# Patient Record
Sex: FEMALE | Race: WHITE | Employment: UNEMPLOYED | URBAN - METROPOLITAN AREA
[De-identification: names, ages, dates, MRNs, and addresses within clinical notes are randomized per-mention and may not be internally consistent; named-entity substitution may affect disease eponyms.]

---

## 2017-12-14 ENCOUNTER — TRANSFERRED RECORDS (OUTPATIENT)
Dept: HEALTH INFORMATION MANAGEMENT | Facility: CLINIC | Age: 42
End: 2017-12-14

## 2018-01-16 ENCOUNTER — MEDICAL CORRESPONDENCE (OUTPATIENT)
Dept: HEALTH INFORMATION MANAGEMENT | Facility: CLINIC | Age: 43
End: 2018-01-16

## 2018-01-16 ENCOUNTER — TRANSFERRED RECORDS (OUTPATIENT)
Dept: HEALTH INFORMATION MANAGEMENT | Facility: CLINIC | Age: 43
End: 2018-01-16

## 2018-01-17 ENCOUNTER — TELEPHONE (OUTPATIENT)
Dept: ORTHOPEDICS | Facility: CLINIC | Age: 43
End: 2018-01-17

## 2018-01-17 NOTE — TELEPHONE ENCOUNTER
"Pt calls today to ask if \"we have received faxes about her\". Pt last seen by Dr Gannon in 2014 and would like to come back and see him again for her hip. Pt was warm transferred to Adult referral line for assistance in getting the referral information, records, etc.   "

## 2018-01-18 NOTE — TELEPHONE ENCOUNTER
APPT INFO    Date /Time: 2/19/18 1:30PM   Reason for Appt: Chronic Rt Hip Pain   Ref Provider/Clinic: Dr. Nima Turner   Are there internal records? Yes/No?  IF YES, list clinic names: Yes - [facility], last seen with Dr. Gannon in 2013    - Imaging is in Pacs  - Right Hip Arthrotomy 6/14/13 op-note in Epic     Are there outside records? Yes/No? Yes - see below   Patient Contact (Y/N) & Call Details: No - pt is referred   Action: Chart reviewed.    Records received from referring clinic, sent to Ortho Clinic.     OUTSIDE RECORDS CHECKLIST     CLINIC NAME COMMENTS REC (x) IMG (x)   [facility] OFFICE NOTES: 1/16/18     RADIOLOGY: XR Rt Hip/Pelvis 1/16/17     x x

## 2018-01-18 NOTE — TELEPHONE ENCOUNTER
ACTION    What did you do? Faxed request to [facility] for additional records and imaging disc

## 2018-01-18 NOTE — TELEPHONE ENCOUNTER
Records Received From: [facility]    Date/Exam/Location  (specify location if different)   Office Notes: 1/16/18 (duplicate), 11/16/17, 1/26/17, 1/19/16   Radiology Reports: XR R Hip/pelvis 1/16/17 (duplicate), 1/26/17   Procedure Notes:  (include injections) Densitometry Report 12/14/17 -- [facility]    Steroid Hip Injection 11/16/17, 1/26/17, 1/19/16   Missing: Need imaging -- per fax, CD is being mailed

## 2018-01-22 NOTE — TELEPHONE ENCOUNTER
Received Imaging From: [facility]    Image Type (x): Disc:_x__  Pacs:___      Exam Date/Name: 1/16/18, 1/26/17, 4/7/15 Pelvis / Hip XRs   Comments: Sent to [facility] by chen

## 2018-02-19 ENCOUNTER — OFFICE VISIT (OUTPATIENT)
Dept: ORTHOPEDICS | Facility: CLINIC | Age: 43
End: 2018-02-19
Payer: COMMERCIAL

## 2018-02-19 ENCOUNTER — PRE VISIT (OUTPATIENT)
Dept: ORTHOPEDICS | Facility: CLINIC | Age: 43
End: 2018-02-19

## 2018-02-19 VITALS — BODY MASS INDEX: 22.15 KG/M2 | HEIGHT: 63 IN | WEIGHT: 125 LBS

## 2018-02-19 DIAGNOSIS — M76.891 ENTHESOPATHY OF RIGHT HIP REGION: ICD-10-CM

## 2018-02-19 DIAGNOSIS — F17.200 SMOKER: ICD-10-CM

## 2018-02-19 RX ORDER — AMMONIUM LACTATE 12 G/100G
CREAM TOPICAL 2 TIMES DAILY
COMMUNITY

## 2018-02-19 RX ORDER — ESTRADIOL 0.1 MG/D
1 FILM, EXTENDED RELEASE TRANSDERMAL
COMMUNITY

## 2018-02-19 RX ORDER — CALCIUM CARBONATE 500 MG/1
1 TABLET, CHEWABLE ORAL DAILY
COMMUNITY

## 2018-02-19 ASSESSMENT — ACTIVITIES OF DAILY LIVING (ADL)
ADL_MEAN: 0
ADL_SUBSCALE_SCORE: 100
ADL_SUM: 0

## 2018-02-19 NOTE — MR AVS SNAPSHOT
"              After Visit Summary   2018    Marylou Gray    MRN: [id number]           Patient Information     Date Of Birth          1975        Visit Information        Provider Department      2018 1:30 PM Casey Gannon MD [facility]        Today's Diagnoses     Smoker        Enthesopathy of right hip region           Follow-ups after your visit        Who to contact     Please call your clinic at [phone] to:    Ask questions about your health    Make or cancel appointments    Discuss your medicines    Learn about your test results    Speak to your doctor            Additional Information About Your Visit        MyChart Information     [employer] is an electronic gateway that provides easy, online access to your medical records. With [employer], you can request a clinic appointment, read your test results, renew a prescription or communicate with your care team.     To sign up for [employer] visit the website at [email or web address]   You will be asked to enter the access code listed below, as well as some personal information. Please follow the directions to create your username and password.     Your access code is: [email or web address]  Expires: 2018  6:30 AM     Your access code will  in 90 days. If you need help or a new code, please contact your [facility] or call [phone] for assistance.        Care EveryWhere ID     This is your Care EveryWhere ID. This could be used by other organizations to access your [place] medical records  [id number]        Your Vitals Were     Height BMI (Body Mass Index)                1.6 m (5' 3\") 22.14 kg/m2           Blood Pressure from Last 3 Encounters:   13 114/70    Weight from Last 3 Encounters:   18 56.7 kg (125 lb)   13 63.5 kg (140 lb)   13 57.7 kg (127 lb 3.3 oz)              Today, you had the following     No orders found for display         Today's Medication " Changes          These changes are accurate as of 2/19/18  2:55 PM.  If you have any questions, ask your nurse or doctor.               Stop taking these medicines if you haven't already. Please contact your care team if you have questions.     benztropine 1 MG tablet   Commonly known as:  COGENTIN   Stopped by:  Casey Gannon MD           EFFEXOR PO   Stopped by:  Casey Gannon MD           HYDROcodone-acetaminophen 5-325 MG per tablet   Commonly known as:  NORCO   Stopped by:  Casey Gannon MD           INVEGA PO   Stopped by:  Casey Gannon MD           propranolol 20 MG tablet   Commonly known as:  INDERAL   Stopped by:  Casey Gannon MD           topiramate 50 MG tablet   Commonly known as:  TOPAMAX   Stopped by:  Casey Gannon MD                    Primary Care Provider Office Phone # Fax #    Ian Correa [phone]       [facility] [address] [facility] [address]        Equal Access to Services     SHANE MADRID AH: Hadii aad ku hadasho Soomaali, waaxda luqadaha, qaybta kaalmada adeegyada, waxay idiin hayaan seth kharash hector . So [facility] [phone].    ATENCIÓN: Si habla español, tiene a stanley disposición servicios gratuitos de asistencia lingüística. Llame al [phone].    We comply with applicable federal civil rights laws and [place] laws. We do not discriminate on the basis of race, color, national origin, age, disability, sex, sexual orientation, or gender identity.            Thank you!     Thank you for choosing [facility]  for your care. Our goal is always to provide you with excellent care. Hearing back from our patients is one way we can continue to improve our services. Please take a few minutes to complete the written survey that you may receive in the mail after your visit with us. Thank you!             Your Updated Medication List - Protect others around you: Learn how to safely use, store and throw away your medicines  at www.disposemymeds.org.          This list is accurate as of 2/19/18  2:55 PM.  Always use your most recent med list.                   Brand Name Dispense Instructions for use Diagnosis    ammonium lactate 12 % cream    AMLACTIN     Apply topically 2 times daily        BELSOMRA 20 MG tablet   Generic drug:  Suvorexant      Take 10 mg by mouth nightly as needed        calcium carbonate 500 MG chewable tablet    TUMS     Take 1 chew tab by mouth daily        DULOXETINE HCL PO      Take 20 mg by mouth        estradiol 0.1 MG/24HR BIW patch    VIVELLE-DOT     Place 1 patch onto the skin twice a week        gabapentin 300 MG capsule    NEURONTIN     Take 400 mg by mouth 3 times daily        IBUPROFEN PO      Take 800 mg by mouth        IMITREX PO      Take 100 mg by mouth as needed.        MEDROXYPROGESTERONE ACETATE PO      Take 2.5 mg by mouth        potassium 75 MG Tabs      Take 20 mg by mouth daily        SEROQUEL PO      Take 500 mg by mouth At Bedtime        VITAMIN D (CHOLECALCIFEROL) PO      Take 32,000 Units by mouth daily

## 2018-02-19 NOTE — LETTER
2/19/2018       RE: Marylou Gray  [address]  [address]  [facility] [address]     Dear Colleague,    Thank you for referring your patient, Marylou Gray, to the [facility] at [facility]. Please see a copy of my visit note below.        [facility]  Orthopaedic Surgery, Joint Replacement Consultation  by Casey Gannon M.D.    Marylou Gray MRN# [id number]   Age: 42 year old YOB: 1975     Requesting physician: Ian Roy            Assessment and Plan:   Assessment:  Right hip chronic bursal irritation.  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    Plan:  Patient requests right hip replacement.  However, I see no indication to perform hip replacement.  Furthermore this is not in her best interest based on ongoing disease severity but her age as well.  I advised that she consider using a cane periodically when her symptoms flare.  Otherwise abductor muscle strengthening exercises would be helpful.  I also strongly advised her to cease all forms of nicotine usage as this will help the soft tissue healing process.  She also inquired about the usage of systemic steroids for treatment and I advised against this treatment.          History of Present Illness:   42 year old female  chief complaint    Right hip pain and discomfort and frequent falling.  Patient notes that she tends to fall frequently and she believes this is related to the symptoms about her right hip joint.  She has had several surgical procedures performed on the right hip initiated by Dr. Konstantin Peña many years ago as noted below.  The exact procedure is unknown however at that time, suture anchors were placed in the greater trochanter.      Background history:  TREATMENTS:  1. 1996, R hip surgery with lateral suture anchor placement (Rodriguez Peña)  2. 11/9/2005, Debridement R hip bursa and hip abductor reconstruction (Rodriguez Peña)  "[facility]  3. 2013, suture removal and bursal debridement R hip (Jagdeep) [facility]             Physical Exam:     EXAMINATION pertinent findings:   VITAL SIGNS: Height 1.6 m (5' 3\"), weight 56.7 kg (125 lb).  Body mass index is 22.14 kg/(m^2).  RESP: non labored breathing   ABD: benign   SKIN: grossly normal   LYMPHATIC: grossly normal   NEURO: grossly normal   VASCULAR: satisfactory perfusion of all extremities   MUSCULOSKELETAL:   She has an antalgic gait.  Abductor muscle strength is decreased on the right side.  There is tenderness to palpation in the region of her incision.  No erythema or warmth or discoloration is appreciated.  Full range of motion of the hip is noted.             Data:   All laboratory data reviewed  All imaging studies reviewed by me    Radiographs from January 16, 2018 performed by Dr. Foss's office demonstrate minimal evidence of degenerative disease involving the right hip joint.  Suture anchors are in place and unchanged since prior films.  No evidence of any osteolysis about the suture anchors.     Casey Gannon MD  [facility] Family Professor  Oncology and Adult Reconstructive Surgery  Dept Orthopaedic Surgery, [facility]  [phone] office, [phone] pager  [email or web address]            DATA for DOCUMENTATION:         Past Medical History:     Patient Active Problem List   Diagnosis     Smoker     Enthesopathy of hip region     Past Medical History:   Diagnosis Date     Learning disability      Ulcer, gastric, acute        Also see scanned health assessment forms.       Past Surgical History:     Past Surgical History:   Procedure Laterality Date     APPENDECTOMY       ARTHROTOMY HIP  6/14/2013    Procedure: ARTHROTOMY HIP;  Bursectomy Right Hip ;  Surgeon: Casey Gannon MD;  Location:  OR      PELVIS/HIP JOINT SURGERY UNLISTED       GYN SURGERY              Social History:     Social History     Social History     Marital status: Single     Spouse name: N/A     Number of " children: N/A     Years of education: N/A     Occupational History     Not on file.     Social History Main Topics     Smoking status: Current Every Day Smoker     Packs/day: 0.50     Years: 29.00     Types: Cigarettes     Smokeless tobacco: Current User     Alcohol use No     Drug use: No     Sexual activity: Yes     Partners: Male     Birth control/ protection: None     Other Topics Concern     Not on file     Social History Narrative            Family History:     No family history on file.         Medications:     Current Outpatient Prescriptions   Medication Sig     VITAMIN D, CHOLECALCIFEROL, PO Take 32,000 Units by mouth daily     potassium 75 MG TABS Take 20 mg by mouth daily     estradiol (VIVELLE-DOT) 0.1 MG/24HR BIW patch Place 1 patch onto the skin twice a week     Suvorexant (BELSOMRA) 20 MG tablet Take 10 mg by mouth nightly as needed     IBUPROFEN PO Take 800 mg by mouth     DULOXETINE HCL PO Take 20 mg by mouth     MEDROXYPROGESTERONE ACETATE PO Take 2.5 mg by mouth     calcium carbonate (TUMS) 500 MG chewable tablet Take 1 chew tab by mouth daily     ammonium lactate (AMLACTIN) 12 % cream Apply topically 2 times daily     SUMAtriptan Succinate (IMITREX PO) Take 100 mg by mouth as needed.     QUEtiapine Fumarate (SEROQUEL PO) Take 500 mg by mouth At Bedtime      gabapentin (NEURONTIN) 300 MG capsule Take 400 mg by mouth 3 times daily      No current facility-administered medications for this visit.               Review of Systems:   A comprehensive 10 point review of systems (constitutional, ENT, cardiac, peripheral vascular, lymphatic, respiratory, GI, , Musculoskeletal, skin, Neurological) was performed and found to be negative except as described in this note.     See intake form completed by patient      Again, thank you for allowing me to participate in the care of your patient.      Sincerely,    Casey Gannon MD

## 2018-02-19 NOTE — NURSING NOTE
"Chief Complaint   Patient presents with     Consult     Pt states that she is here today to Discuss having Right TRISTIAN. Bursectomy Right Hip DOS: 6/14/13 done by Dr. Gannon        42 year old  1975    Ht 1.6 m (5' 3\")  Wt 56.7 kg (125 lb)  BMI 22.14 kg/m2         [facility] [place] [address]    Allergies   Allergen Reactions     Grass Other (See Comments)     \"Sniffles\"     Mold Other (See Comments)     Stuffy nose     Tylenol With Codeine [Acetaminophen-Codeine] Nausea and Vomiting     Current Outpatient Prescriptions   Medication     VITAMIN D, CHOLECALCIFEROL, PO     potassium 75 MG TABS     estradiol (VIVELLE-DOT) 0.1 MG/24HR BIW patch     Suvorexant (BELSOMRA) 20 MG tablet     IBUPROFEN PO     DULOXETINE HCL PO     MEDROXYPROGESTERONE ACETATE PO     calcium carbonate (TUMS) 500 MG chewable tablet     ammonium lactate (AMLACTIN) 12 % cream     SUMAtriptan Succinate (IMITREX PO)     QUEtiapine Fumarate (SEROQUEL PO)     gabapentin (NEURONTIN) 300 MG capsule     No current facility-administered medications for this visit.          Questionnaires:  HOOS Hip Dysfunction & Osteoarthritis Outcome Questionnaire    Hip Dysfunction & Osteoarthritis Outcome Score (HOOS), English Version LK 2.0 (Janak BENAVIDEZ, Chris E, James VINCENT., 2003) 2/19/2018   S1. Do you feel grinding, hear clicking or any other type of noise from your hip? Never   S2. Difficulties spreading legs wide apart None   S3. Difficulties to stride out when walking None   S4. How severe is your hip joint stiffness after first wakening in the morning? None   S5. How severe is your hip stiffness after sitting, lying or resting LATER IN THE DAY? None   Symptom Count 5   Symptom Sum 0   Symptom Mean 0   Symptom Subscale Score 100   P1. How often is your hip painful? Never   P2. Straightening your hip fully None   P3. Bending your hip FULLY None   P4. Walking on a flat surface None   P5. Going up or down stairs None   P6. " At night while in bed None   P7. Sitting or lying None   P8. Standing upright None   P9. Walking on a hard surface (asphalt, concrete, etc.) None   P10. Walking on an uneven surface None   Pain Count 10   Pain Sum 0   Pain Mean 0   Pain Subscale Score 100   A1. Descending stairs None   A2. Ascending stairs None   A3. Rising from sitting None   A4. Standing None   A5. Bending to the floor/ an object None   A6. Walking on a flat surface None   A7. Getting in/out of car None   A8. Going shopping None   A9. Putting on socks/stockings None   A10. Rising from bed None   A11. Taking off socks/stockings None   A12. Lying in bed (turning over, maintaining hip position) None   A13. Getting in/out of bed None   A14. Sitting None   A15. Getting on/off toilet None   A16. Heavy domestic duties (moving heavy boxes, scrubbing floors, etc.) None   A17. Light domestic duties (cooking, dusting, etc.) None   ADL Count 17   ADL Sum 0   ADL Mean 0   ADL Subscale Score 100   SP1. Squatting None   SP2. Running None   SP3. Twisting/pivoting on loaded leg None   SP4. Walking on uneven surface None   Sports/Rec Count 4   Sports/Rec Sum 0   Sports/Rec Mean 0   Sports/Rec Subscale Score 100   Q1. How often are you aware of your hip problem? Never   Q2. Have you modified you life style to avoid activities potentially damaging to your hip? Not at all   Q3. How much are you troubled with lack of confidence in your hip? Not at all   Q4. In general, how much difficulty do you have with your hip? None   QOL Count 4   QOL Sum 0   QOL Mean 0   Quality of Life Subscale Score 100            Promis 10 Assessment    PROMIS 10 2/19/2018   In general, would you say your health is: Good   In general, would you say your quality of life is: Good   In general, how would you rate your physical health? Good   In general, how would you rate your mental health, including your mood and your ability to think? Good   In general, how would you rate your  satisfaction with your social activities and relationships? Good   In general, please rate how well you carry out your usual social activities and roles Good   To what extent are you able to carry out your everyday physical activities such as walking, climbing stairs, carrying groceries, or moving a chair? Not at all   How often have you been bothered by emotional problems such as feeling anxious, depressed or irritable? Often   How would you rate your fatigue on average? None   How would you rate your pain on average?   0 = No Pain  to  10 = Worst Imaginable Pain 7   In general, would you say your health is: 3   In general, would you say your quality of life is: 3   In general, how would you rate your physical health? 3   In general, how would you rate your mental health, including your mood and your ability to think? 3   In general, how would you rate your satisfaction with your social activities and relationships? 3   In general, please rate how well you carry out your usual social activities and roles. (This includes activities at home, at work and in your community, and responsibilities as a parent, child, spouse, employee, friend, etc.) 3   To what extent are you able to carry out your everyday physical activities such as walking, climbing stairs, carrying groceries, or moving a chair? 1   In the past 7 days, how often have you been bothered by emotional problems such as feeling anxious, depressed, or irritable? 4   In the past 7 days, how would you rate your fatigue on average? 1   In the past 7 days, how would you rate your pain on average, where 0 means no pain, and 10 means worst imaginable pain? 7   Global Mental Health Score 11   Global Physical Health Score 11   PROMIS TOTAL - SUBSCORES 22   Some recent data might be hidden                Key Frias C.M.A.

## 2018-02-19 NOTE — PROGRESS NOTES
"    [facility]  Orthopaedic Surgery, Joint Replacement Consultation  by Caesy Gannon M.D.    Marylou Gray MRN# [id number]   Age: 42 year old YOB: 1975     Requesting physician: Ian Roy            Assessment and Plan:   Assessment:  Right hip chronic bursal irritation.  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    Plan:  Patient requests right hip replacement.  However, I see no indication to perform hip replacement.  Furthermore this is not in her best interest based on ongoing disease severity but her age as well.  I advised that she consider using a cane periodically when her symptoms flare.  Otherwise abductor muscle strengthening exercises would be helpful.  I also strongly advised her to cease all forms of nicotine usage as this will help the soft tissue healing process.  She also inquired about the usage of systemic steroids for treatment and I advised against this treatment.          History of Present Illness:   42 year old female  chief complaint    Right hip pain and discomfort and frequent falling.  Patient notes that she tends to fall frequently and she believes this is related to the symptoms about her right hip joint.  She has had several surgical procedures performed on the right hip initiated by Dr. Konstantin Peña many years ago as noted below.  The exact procedure is unknown however at that time, suture anchors were placed in the greater trochanter.      Background history:  TREATMENTS:  1. 1996, R hip surgery with lateral suture anchor placement (Rodriguez Peña)  2. 11/9/2005, Debridement R hip bursa and hip abductor reconstruction (Rodriguez Peña) [facility]  3. 2013, suture removal and bursal debridement R hip (Jagdeep) [facility]             Physical Exam:     EXAMINATION pertinent findings:   VITAL SIGNS: Height 1.6 m (5' 3\"), weight 56.7 kg (125 lb).  Body mass index is 22.14 kg/(m^2).  RESP: non labored breathing   ABD: benign   SKIN: grossly " normal   LYMPHATIC: grossly normal   NEURO: grossly normal   VASCULAR: satisfactory perfusion of all extremities   MUSCULOSKELETAL:   She has an antalgic gait.  Abductor muscle strength is decreased on the right side.  There is tenderness to palpation in the region of her incision.  No erythema or warmth or discoloration is appreciated.  Full range of motion of the hip is noted.             Data:   All laboratory data reviewed  All imaging studies reviewed by me    Radiographs from January 16, 2018 performed by Dr. Foss's office demonstrate minimal evidence of degenerative disease involving the right hip joint.  Suture anchors are in place and unchanged since prior films.  No evidence of any osteolysis about the suture anchors.     Casey Gannon MD [facility]  Oncology and Adult Reconstructive Surgery  Dept Orthopaedic Surgery, [facility]  [phone] office, [phone] pager  [email or web address]            DATA for DOCUMENTATION:         Past Medical History:     Patient Active Problem List   Diagnosis     Smoker     Enthesopathy of hip region     Past Medical History:   Diagnosis Date     Learning disability      Ulcer, gastric, acute        Also see scanned health assessment forms.       Past Surgical History:     Past Surgical History:   Procedure Laterality Date     APPENDECTOMY       ARTHROTOMY HIP  6/14/2013    Procedure: ARTHROTOMY HIP;  Bursectomy Right Hip ;  Surgeon: Casey Gannon MD;  Location:  OR      PELVIS/HIP JOINT SURGERY UNLISTED       GYN SURGERY              Social History:     Social History     Social History     Marital status: Single     Spouse name: N/A     Number of children: N/A     Years of education: N/A     Occupational History     Not on file.     Social History Main Topics     Smoking status: Current Every Day Smoker     Packs/day: 0.50     Years: 29.00     Types: Cigarettes     Smokeless tobacco: Current User     Alcohol use No     Drug use: No      Sexual activity: Yes     Partners: Male     Birth control/ protection: None     Other Topics Concern     Not on file     Social History Narrative            Family History:     No family history on file.         Medications:     Current Outpatient Prescriptions   Medication Sig     VITAMIN D, CHOLECALCIFEROL, PO Take 32,000 Units by mouth daily     potassium 75 MG TABS Take 20 mg by mouth daily     estradiol (VIVELLE-DOT) 0.1 MG/24HR BIW patch Place 1 patch onto the skin twice a week     Suvorexant (BELSOMRA) 20 MG tablet Take 10 mg by mouth nightly as needed     IBUPROFEN PO Take 800 mg by mouth     DULOXETINE HCL PO Take 20 mg by mouth     MEDROXYPROGESTERONE ACETATE PO Take 2.5 mg by mouth     calcium carbonate (TUMS) 500 MG chewable tablet Take 1 chew tab by mouth daily     ammonium lactate (AMLACTIN) 12 % cream Apply topically 2 times daily     SUMAtriptan Succinate (IMITREX PO) Take 100 mg by mouth as needed.     QUEtiapine Fumarate (SEROQUEL PO) Take 500 mg by mouth At Bedtime      gabapentin (NEURONTIN) 300 MG capsule Take 400 mg by mouth 3 times daily      No current facility-administered medications for this visit.               Review of Systems:   A comprehensive 10 point review of systems (constitutional, ENT, cardiac, peripheral vascular, lymphatic, respiratory, GI, , Musculoskeletal, skin, Neurological) was performed and found to be negative except as described in this note.     See intake form completed by patient    Answers for HPI/ROS submitted by the patient on 2/19/2018   General Symptoms: No  Skin Symptoms: No  HENT Symptoms: No  EYE SYMPTOMS: No  HEART SYMPTOMS: No  LUNG SYMPTOMS: No  INTESTINAL SYMPTOMS: No  URINARY SYMPTOMS: No  GYNECOLOGIC SYMPTOMS: No  BREAST SYMPTOMS: No  SKELETAL SYMPTOMS: No  BLOOD SYMPTOMS: No  NERVOUS SYSTEM SYMPTOMS: No  MENTAL HEALTH SYMPTOMS: No